# Patient Record
Sex: FEMALE | Race: OTHER | HISPANIC OR LATINO
[De-identification: names, ages, dates, MRNs, and addresses within clinical notes are randomized per-mention and may not be internally consistent; named-entity substitution may affect disease eponyms.]

---

## 2017-01-17 ENCOUNTER — RX RENEWAL (OUTPATIENT)
Age: 82
End: 2017-01-17

## 2017-04-18 ENCOUNTER — RX RENEWAL (OUTPATIENT)
Age: 82
End: 2017-04-18

## 2017-04-25 ENCOUNTER — RX RENEWAL (OUTPATIENT)
Age: 82
End: 2017-04-25

## 2017-05-16 ENCOUNTER — APPOINTMENT (OUTPATIENT)
Dept: INTERNAL MEDICINE | Facility: CLINIC | Age: 82
End: 2017-05-16
Payer: COMMERCIAL

## 2017-05-16 VITALS
HEIGHT: 63 IN | HEART RATE: 71 BPM | OXYGEN SATURATION: 98 % | RESPIRATION RATE: 17 BRPM | WEIGHT: 181 LBS | BODY MASS INDEX: 32.07 KG/M2 | SYSTOLIC BLOOD PRESSURE: 140 MMHG | DIASTOLIC BLOOD PRESSURE: 80 MMHG | TEMPERATURE: 97.9 F

## 2017-05-16 DIAGNOSIS — B34.9 VIRAL INFECTION, UNSPECIFIED: ICD-10-CM

## 2017-05-16 DIAGNOSIS — R05 COUGH: ICD-10-CM

## 2017-05-16 PROCEDURE — 99213 OFFICE O/P EST LOW 20 MIN: CPT

## 2017-05-16 RX ORDER — CHOLECALCIFEROL (VITAMIN D3) 1250 MCG
1.25 MG CAPSULE ORAL
Qty: 12 | Refills: 0 | Status: ACTIVE | COMMUNITY
Start: 2016-12-29

## 2017-05-17 LAB
25(OH)D3 SERPL-MCNC: 45 NG/ML
ALBUMIN SERPL ELPH-MCNC: 4.4 G/DL
ALP BLD-CCNC: 88 U/L
ALT SERPL-CCNC: 11 U/L
ANION GAP SERPL CALC-SCNC: 14 MMOL/L
AST SERPL-CCNC: 14 U/L
BASOPHILS # BLD AUTO: 0.08 K/UL
BASOPHILS NFR BLD AUTO: 1.5 %
BILIRUB SERPL-MCNC: 0.5 MG/DL
BUN SERPL-MCNC: 9 MG/DL
CALCIUM SERPL-MCNC: 9.1 MG/DL
CHLORIDE SERPL-SCNC: 102 MMOL/L
CO2 SERPL-SCNC: 25 MMOL/L
CREAT SERPL-MCNC: 0.65 MG/DL
EOSINOPHIL # BLD AUTO: 0.17 K/UL
EOSINOPHIL NFR BLD AUTO: 3.1 %
GLUCOSE SERPL-MCNC: 103 MG/DL
HBA1C MFR BLD HPLC: 5.3 %
HCT VFR BLD CALC: 42.4 %
HGB BLD-MCNC: 13.8 G/DL
IMM GRANULOCYTES NFR BLD AUTO: 0.2 %
LYMPHOCYTES # BLD AUTO: 1.12 K/UL
LYMPHOCYTES NFR BLD AUTO: 20.3 %
MAN DIFF?: NORMAL
MCHC RBC-ENTMCNC: 30.7 PG
MCHC RBC-ENTMCNC: 32.5 GM/DL
MCV RBC AUTO: 94.4 FL
MONOCYTES # BLD AUTO: 0.47 K/UL
MONOCYTES NFR BLD AUTO: 8.5 %
NEUTROPHILS # BLD AUTO: 3.66 K/UL
NEUTROPHILS NFR BLD AUTO: 66.4 %
PLATELET # BLD AUTO: 202 K/UL
POTASSIUM SERPL-SCNC: 4.6 MMOL/L
PROT SERPL-MCNC: 6.5 G/DL
RBC # BLD: 4.49 M/UL
RBC # FLD: 13.7 %
SODIUM SERPL-SCNC: 141 MMOL/L
TSH SERPL-ACNC: 4.39 UIU/ML
WBC # FLD AUTO: 5.51 K/UL

## 2017-08-29 ENCOUNTER — RX RENEWAL (OUTPATIENT)
Age: 82
End: 2017-08-29

## 2017-11-07 ENCOUNTER — RX RENEWAL (OUTPATIENT)
Age: 82
End: 2017-11-07

## 2017-12-20 ENCOUNTER — APPOINTMENT (OUTPATIENT)
Dept: INTERNAL MEDICINE | Facility: CLINIC | Age: 82
End: 2017-12-20

## 2018-01-09 ENCOUNTER — APPOINTMENT (OUTPATIENT)
Dept: INTERNAL MEDICINE | Facility: CLINIC | Age: 83
End: 2018-01-09
Payer: MEDICARE

## 2018-01-09 ENCOUNTER — LABORATORY RESULT (OUTPATIENT)
Age: 83
End: 2018-01-09

## 2018-01-09 VITALS
BODY MASS INDEX: 30.48 KG/M2 | OXYGEN SATURATION: 98 % | SYSTOLIC BLOOD PRESSURE: 126 MMHG | HEIGHT: 63 IN | HEART RATE: 77 BPM | WEIGHT: 172 LBS | RESPIRATION RATE: 16 BRPM | DIASTOLIC BLOOD PRESSURE: 84 MMHG | TEMPERATURE: 97.2 F

## 2018-01-09 DIAGNOSIS — L53.8 OTHER SPECIFIED ERYTHEMATOUS CONDITIONS: ICD-10-CM

## 2018-01-09 PROCEDURE — 90686 IIV4 VACC NO PRSV 0.5 ML IM: CPT

## 2018-01-09 PROCEDURE — G0008: CPT

## 2018-01-09 PROCEDURE — G0009: CPT

## 2018-01-09 PROCEDURE — 99213 OFFICE O/P EST LOW 20 MIN: CPT | Mod: 25

## 2018-01-09 PROCEDURE — 90670 PCV13 VACCINE IM: CPT

## 2018-01-09 RX ORDER — AZITHROMYCIN 250 MG/1
250 TABLET, FILM COATED ORAL
Qty: 6 | Refills: 0 | Status: DISCONTINUED | COMMUNITY
Start: 2017-05-16 | End: 2018-01-09

## 2018-01-09 RX ORDER — MELOXICAM 15 MG/1
15 TABLET ORAL
Qty: 30 | Refills: 0 | Status: DISCONTINUED | COMMUNITY
Start: 2016-11-18 | End: 2018-01-09

## 2018-01-10 LAB
25(OH)D3 SERPL-MCNC: 53.1 NG/ML
ALBUMIN SERPL ELPH-MCNC: 4.3 G/DL
ALP BLD-CCNC: 82 U/L
ALT SERPL-CCNC: 13 U/L
ANION GAP SERPL CALC-SCNC: 13 MMOL/L
APPEARANCE: CLEAR
AST SERPL-CCNC: 18 U/L
BASOPHILS # BLD AUTO: 0.08 K/UL
BASOPHILS NFR BLD AUTO: 1.8 %
BILIRUB SERPL-MCNC: 0.6 MG/DL
BILIRUBIN URINE: NEGATIVE
BLOOD URINE: NEGATIVE
BUN SERPL-MCNC: 14 MG/DL
CALCIUM SERPL-MCNC: 9.4 MG/DL
CHLORIDE SERPL-SCNC: 99 MMOL/L
CHOLEST SERPL-MCNC: 181 MG/DL
CHOLEST/HDLC SERPL: 3 RATIO
CO2 SERPL-SCNC: 26 MMOL/L
COLOR: YELLOW
CREAT SERPL-MCNC: 0.69 MG/DL
EOSINOPHIL # BLD AUTO: 0.07 K/UL
EOSINOPHIL NFR BLD AUTO: 1.6 %
GLUCOSE QUALITATIVE U: NEGATIVE MG/DL
GLUCOSE SERPL-MCNC: 102 MG/DL
HBA1C MFR BLD HPLC: 5.4 %
HCT VFR BLD CALC: 42.8 %
HDLC SERPL-MCNC: 60 MG/DL
HGB BLD-MCNC: 14 G/DL
IMM GRANULOCYTES NFR BLD AUTO: 0.2 %
KETONES URINE: NEGATIVE
LDLC SERPL CALC-MCNC: 102 MG/DL
LEUKOCYTE ESTERASE URINE: ABNORMAL
LYMPHOCYTES # BLD AUTO: 1.08 K/UL
LYMPHOCYTES NFR BLD AUTO: 24.7 %
MAN DIFF?: NORMAL
MCHC RBC-ENTMCNC: 31.1 PG
MCHC RBC-ENTMCNC: 32.7 GM/DL
MCV RBC AUTO: 95.1 FL
MONOCYTES # BLD AUTO: 0.31 K/UL
MONOCYTES NFR BLD AUTO: 7.1 %
NEUTROPHILS # BLD AUTO: 2.83 K/UL
NEUTROPHILS NFR BLD AUTO: 64.6 %
NITRITE URINE: NEGATIVE
PH URINE: 5
PLATELET # BLD AUTO: 184 K/UL
POTASSIUM SERPL-SCNC: 4.7 MMOL/L
PROT SERPL-MCNC: 6.6 G/DL
PROTEIN URINE: ABNORMAL MG/DL
RBC # BLD: 4.5 M/UL
RBC # FLD: 13.7 %
SODIUM SERPL-SCNC: 138 MMOL/L
SPECIFIC GRAVITY URINE: 1.02
TRIGL SERPL-MCNC: 94 MG/DL
TSH SERPL-ACNC: 4.1 UIU/ML
UROBILINOGEN URINE: NEGATIVE MG/DL
WBC # FLD AUTO: 4.38 K/UL

## 2018-02-26 ENCOUNTER — RX RENEWAL (OUTPATIENT)
Age: 83
End: 2018-02-26

## 2018-03-12 ENCOUNTER — RX RENEWAL (OUTPATIENT)
Age: 83
End: 2018-03-12

## 2018-09-25 NOTE — COUNSELING
[Healthy eating counseling provided] : healthy eating [Activity counseling provided] : activity [___ min/wk activity recommended] : [unfilled] min/wk activity recommended [Walking] : Walking [None] : None [Good understanding] : Patient has a good understanding of lifestyle changes and the steps needed to achieve self management goals

## 2018-09-26 ENCOUNTER — LABORATORY RESULT (OUTPATIENT)
Age: 83
End: 2018-09-26

## 2018-09-26 ENCOUNTER — APPOINTMENT (OUTPATIENT)
Dept: INTERNAL MEDICINE | Facility: CLINIC | Age: 83
End: 2018-09-26
Payer: MEDICARE

## 2018-09-26 VITALS
TEMPERATURE: 98.4 F | HEIGHT: 63 IN | WEIGHT: 163 LBS | DIASTOLIC BLOOD PRESSURE: 83 MMHG | BODY MASS INDEX: 28.88 KG/M2 | RESPIRATION RATE: 16 BRPM | HEART RATE: 71 BPM | SYSTOLIC BLOOD PRESSURE: 169 MMHG | OXYGEN SATURATION: 95 %

## 2018-09-26 DIAGNOSIS — E78.5 HYPERLIPIDEMIA, UNSPECIFIED: ICD-10-CM

## 2018-09-26 DIAGNOSIS — Z00.00 ENCOUNTER FOR GENERAL ADULT MEDICAL EXAMINATION W/OUT ABNORMAL FINDINGS: ICD-10-CM

## 2018-09-26 DIAGNOSIS — F03.90 UNSPECIFIED DEMENTIA W/OUT BEHAVIORAL DISTURBANCE: ICD-10-CM

## 2018-09-26 PROCEDURE — 99214 OFFICE O/P EST MOD 30 MIN: CPT

## 2018-09-26 NOTE — HISTORY OF PRESENT ILLNESS
[Spouse] : spouse [FreeTextEntry1] : f/u [de-identified] : 81 yo  reports worsening of recent memory  in pt.\par PMH mild HTN,PAD,st.p.stent placement to L.popliteal artery,balloon angioplasty to R.popliteal artery,on dual APT.\par h/o HLP,heart murmur,[place] [facility] [place]carotid [place].denies CV Sx's

## 2018-09-26 NOTE — ASSESSMENT
[FreeTextEntry1] : 81 yo with well controlled HTN,HLP,subclinical hypothyroidism,h/o PAD,st.p.b/l revascularisation procedures(stent on the L.,balloon on the R.),denies claudication.PE as before,exc.mild elevation of BP,on repeat take VH=242/82.will increase Atenolol to 50 mg.\par pt and her  refused to try Aricept.\par \par f/u labs requested.

## 2018-09-26 NOTE — PHYSICAL EXAM
[No Acute Distress] : no acute distress [Well Developed] : well developed [Well-Appearing] : well-appearing [Normal Voice/Communication] : normal voice/communication [Normal Sclera/Conjunctiva] : normal sclera/conjunctiva [PERRL] : pupils equal round and reactive to light [EOMI] : extraocular movements intact [Normal Oropharynx] : the oropharynx was normal [No JVD] : no jugular venous distention [Supple] : supple [No Lymphadenopathy] : no lymphadenopathy [Thyroid Normal, No Nodules] : the thyroid was normal and there were no nodules present [No Respiratory Distress] : no respiratory distress  [Clear to Auscultation] : lungs were clear to auscultation bilaterally [Normal Rate] : normal rate  [Regular Rhythm] : with a regular rhythm [Normal S1, S2] : normal S1 and S2 [No Edema] : there was no peripheral edema [Normal Appearance] : normal in appearance [No Nipple Discharge] : no nipple discharge [No Axillary Lymphadenopathy] : no axillary lymphadenopathy [Soft] : abdomen soft [No Masses] : no abdominal mass palpated [No HSM] : no HSM [Normal Sphincter Tone] : normal sphincter tone [No Mass] : no mass [Normal Posterior Cervical Nodes] : no posterior cervical lymphadenopathy [Normal Anterior Cervical Nodes] : no anterior cervical lymphadenopathy [Kyphosis] : kyphosis [No Joint Swelling] : no joint swelling [Normal Gait] : normal gait [No Focal Deficits] : no focal deficits [Normal Affect] : the affect was normal [Normal Insight/Judgement] : insight and judgment were intact [Declined Rectal Exam] : declined rectal exam [No Rash] : no rash [de-identified] : overwt [de-identified] : MICHAEL 1/6 over precordium [de-identified] : faint carotid bruit,b/l.PP not appreciated. [de-identified] : perianal erythema

## 2018-09-27 ENCOUNTER — RX RENEWAL (OUTPATIENT)
Age: 83
End: 2018-09-27

## 2018-09-27 LAB
25(OH)D3 SERPL-MCNC: 29.7 NG/ML
ALBUMIN SERPL ELPH-MCNC: 4.5 G/DL
ALP BLD-CCNC: 91 U/L
ALT SERPL-CCNC: 12 U/L
ANION GAP SERPL CALC-SCNC: 17 MMOL/L
APPEARANCE: CLEAR
AST SERPL-CCNC: 15 U/L
BASOPHILS # BLD AUTO: 0.08 K/UL
BASOPHILS NFR BLD AUTO: 1.7 %
BILIRUB SERPL-MCNC: 0.6 MG/DL
BILIRUBIN URINE: NEGATIVE
BLOOD URINE: NEGATIVE
BUN SERPL-MCNC: 8 MG/DL
CALCIUM SERPL-MCNC: 9.3 MG/DL
CHLORIDE SERPL-SCNC: 100 MMOL/L
CHOLEST SERPL-MCNC: 161 MG/DL
CHOLEST/HDLC SERPL: 2.6 RATIO
CO2 SERPL-SCNC: 25 MMOL/L
COLOR: YELLOW
CREAT SERPL-MCNC: 0.61 MG/DL
EOSINOPHIL # BLD AUTO: 0.09 K/UL
EOSINOPHIL NFR BLD AUTO: 1.9 %
GLUCOSE QUALITATIVE U: NEGATIVE MG/DL
GLUCOSE SERPL-MCNC: 79 MG/DL
HBA1C MFR BLD HPLC: 5.5 %
HCT VFR BLD CALC: 42.2 %
HDLC SERPL-MCNC: 61 MG/DL
HGB BLD-MCNC: 14.2 G/DL
IMM GRANULOCYTES NFR BLD AUTO: 0 %
KETONES URINE: NEGATIVE
LDLC SERPL CALC-MCNC: 79 MG/DL
LEUKOCYTE ESTERASE URINE: ABNORMAL
LYMPHOCYTES # BLD AUTO: 1.17 K/UL
LYMPHOCYTES NFR BLD AUTO: 24.6 %
MAN DIFF?: NORMAL
MCHC RBC-ENTMCNC: 31.6 PG
MCHC RBC-ENTMCNC: 33.6 GM/DL
MCV RBC AUTO: 94 FL
MONOCYTES # BLD AUTO: 0.32 K/UL
MONOCYTES NFR BLD AUTO: 6.7 %
NEUTROPHILS # BLD AUTO: 3.09 K/UL
NEUTROPHILS NFR BLD AUTO: 65.1 %
NITRITE URINE: NEGATIVE
PH URINE: 6.5
PLATELET # BLD AUTO: 175 K/UL
POTASSIUM SERPL-SCNC: 4.4 MMOL/L
PROT SERPL-MCNC: 6.8 G/DL
PROTEIN URINE: NEGATIVE MG/DL
RBC # BLD: 4.49 M/UL
RBC # FLD: 13.9 %
SODIUM SERPL-SCNC: 142 MMOL/L
SPECIFIC GRAVITY URINE: 1.01
TRIGL SERPL-MCNC: 103 MG/DL
TSH SERPL-ACNC: 3.14 UIU/ML
UROBILINOGEN URINE: 1 MG/DL
WBC # FLD AUTO: 4.75 K/UL

## 2018-10-01 LAB
T PALLIDUM AB SER QL IA: NEGATIVE
VIT B12 SERPL-MCNC: 430 PG/ML

## 2018-10-26 ENCOUNTER — RX RENEWAL (OUTPATIENT)
Age: 83
End: 2018-10-26

## 2018-11-13 ENCOUNTER — APPOINTMENT (OUTPATIENT)
Dept: INTERNAL MEDICINE | Facility: CLINIC | Age: 83
End: 2018-11-13
Payer: MEDICARE

## 2018-11-13 PROCEDURE — G0008: CPT

## 2018-11-13 PROCEDURE — 90662 IIV NO PRSV INCREASED AG IM: CPT

## 2018-12-13 ENCOUNTER — MEDICATION RENEWAL (OUTPATIENT)
Age: 83
End: 2018-12-13

## 2019-02-19 RX ORDER — DESONIDE 0.5 MG/G
0.05 CREAM TOPICAL
Qty: 1 | Refills: 1 | Status: ACTIVE | COMMUNITY
Start: 2018-01-09 | End: 1900-01-01

## 2019-05-22 ENCOUNTER — INPATIENT (INPATIENT)
Facility: HOSPITAL | Age: 84
LOS: 1 days | Discharge: HOME | DRG: 554 | End: 2019-05-24
Attending: INTERNAL MEDICINE | Admitting: INTERNAL MEDICINE
Payer: MEDICARE

## 2019-05-22 ENCOUNTER — APPOINTMENT (OUTPATIENT)
Dept: INTERNAL MEDICINE | Facility: CLINIC | Age: 84
End: 2019-05-22
Payer: MEDICARE

## 2019-05-22 VITALS
WEIGHT: 169.98 LBS | OXYGEN SATURATION: 96 % | RESPIRATION RATE: 16 BRPM | TEMPERATURE: 98 F | HEIGHT: 65 IN | DIASTOLIC BLOOD PRESSURE: 84 MMHG | SYSTOLIC BLOOD PRESSURE: 142 MMHG | HEART RATE: 81 BPM

## 2019-05-22 VITALS
SYSTOLIC BLOOD PRESSURE: 152 MMHG | HEART RATE: 86 BPM | RESPIRATION RATE: 16 BRPM | DIASTOLIC BLOOD PRESSURE: 80 MMHG | OXYGEN SATURATION: 98 %

## 2019-05-22 DIAGNOSIS — I99.9 UNSPECIFIED DISORDER OF CIRCULATORY SYSTEM: Chronic | ICD-10-CM

## 2019-05-22 DIAGNOSIS — Z98.62 PERIPHERAL VASCULAR ANGIOPLASTY STATUS: Chronic | ICD-10-CM

## 2019-05-22 DIAGNOSIS — I73.9 PERIPHERAL VASCULAR DISEASE, UNSPECIFIED: ICD-10-CM

## 2019-05-22 DIAGNOSIS — L03.90 CELLULITIS, UNSPECIFIED: ICD-10-CM

## 2019-05-22 LAB
ACETONE SERPL-MCNC: NEGATIVE — SIGNIFICANT CHANGE UP
ALBUMIN SERPL ELPH-MCNC: 3.7 G/DL — SIGNIFICANT CHANGE UP (ref 3.5–5)
ALP SERPL-CCNC: 92 U/L — SIGNIFICANT CHANGE UP (ref 40–120)
ALT FLD-CCNC: 15 U/L DA — SIGNIFICANT CHANGE UP (ref 10–60)
ANION GAP SERPL CALC-SCNC: 7 MMOL/L — SIGNIFICANT CHANGE UP (ref 5–17)
APPEARANCE UR: ABNORMAL
APTT BLD: 29.8 SEC — SIGNIFICANT CHANGE UP (ref 27.5–36.3)
AST SERPL-CCNC: 22 U/L — SIGNIFICANT CHANGE UP (ref 10–40)
BACTERIA # UR AUTO: ABNORMAL /HPF
BASOPHILS # BLD AUTO: 0.09 K/UL — SIGNIFICANT CHANGE UP (ref 0–0.2)
BASOPHILS NFR BLD AUTO: 1.4 % — SIGNIFICANT CHANGE UP (ref 0–2)
BILIRUB SERPL-MCNC: 0.7 MG/DL — SIGNIFICANT CHANGE UP (ref 0.2–1.2)
BILIRUB UR-MCNC: NEGATIVE — SIGNIFICANT CHANGE UP
BUN SERPL-MCNC: 7 MG/DL — SIGNIFICANT CHANGE UP (ref 7–18)
CALCIUM SERPL-MCNC: 8.6 MG/DL — SIGNIFICANT CHANGE UP (ref 8.4–10.5)
CHLORIDE SERPL-SCNC: 103 MMOL/L — SIGNIFICANT CHANGE UP (ref 96–108)
CO2 SERPL-SCNC: 28 MMOL/L — SIGNIFICANT CHANGE UP (ref 22–31)
COLOR SPEC: YELLOW — SIGNIFICANT CHANGE UP
CREAT SERPL-MCNC: 0.66 MG/DL — SIGNIFICANT CHANGE UP (ref 0.5–1.3)
DIFF PNL FLD: ABNORMAL
EOSINOPHIL # BLD AUTO: 0.03 K/UL — SIGNIFICANT CHANGE UP (ref 0–0.5)
EOSINOPHIL NFR BLD AUTO: 0.5 % — SIGNIFICANT CHANGE UP (ref 0–6)
EPI CELLS # UR: ABNORMAL /HPF
GLUCOSE SERPL-MCNC: 129 MG/DL — HIGH (ref 70–99)
GLUCOSE UR QL: NEGATIVE — SIGNIFICANT CHANGE UP
HCT VFR BLD CALC: 39.9 % — SIGNIFICANT CHANGE UP (ref 34.5–45)
HGB BLD-MCNC: 13.3 G/DL — SIGNIFICANT CHANGE UP (ref 11.5–15.5)
IMM GRANULOCYTES NFR BLD AUTO: 0.3 % — SIGNIFICANT CHANGE UP (ref 0–1.5)
INR BLD: 1.09 RATIO — SIGNIFICANT CHANGE UP (ref 0.88–1.16)
KETONES UR-MCNC: ABNORMAL
LACTATE SERPL-SCNC: 1.1 MMOL/L — SIGNIFICANT CHANGE UP (ref 0.7–2)
LEUKOCYTE ESTERASE UR-ACNC: ABNORMAL
LYMPHOCYTES # BLD AUTO: 1.04 K/UL — SIGNIFICANT CHANGE UP (ref 1–3.3)
LYMPHOCYTES # BLD AUTO: 15.9 % — SIGNIFICANT CHANGE UP (ref 13–44)
MAGNESIUM SERPL-MCNC: 2 MG/DL — SIGNIFICANT CHANGE UP (ref 1.6–2.6)
MCHC RBC-ENTMCNC: 31.3 PG — SIGNIFICANT CHANGE UP (ref 27–34)
MCHC RBC-ENTMCNC: 33.3 GM/DL — SIGNIFICANT CHANGE UP (ref 32–36)
MCV RBC AUTO: 93.9 FL — SIGNIFICANT CHANGE UP (ref 80–100)
MONOCYTES # BLD AUTO: 0.45 K/UL — SIGNIFICANT CHANGE UP (ref 0–0.9)
MONOCYTES NFR BLD AUTO: 6.9 % — SIGNIFICANT CHANGE UP (ref 2–14)
NEUTROPHILS # BLD AUTO: 4.92 K/UL — SIGNIFICANT CHANGE UP (ref 1.8–7.4)
NEUTROPHILS NFR BLD AUTO: 75 % — SIGNIFICANT CHANGE UP (ref 43–77)
NITRITE UR-MCNC: POSITIVE
NRBC # BLD: 0 /100 WBCS — SIGNIFICANT CHANGE UP (ref 0–0)
NT-PROBNP SERPL-SCNC: 1002 PG/ML — HIGH (ref 0–450)
PH UR: 5 — SIGNIFICANT CHANGE UP (ref 5–8)
PLATELET # BLD AUTO: 202 K/UL — SIGNIFICANT CHANGE UP (ref 150–400)
POTASSIUM SERPL-MCNC: 3.6 MMOL/L — SIGNIFICANT CHANGE UP (ref 3.5–5.3)
POTASSIUM SERPL-SCNC: 3.6 MMOL/L — SIGNIFICANT CHANGE UP (ref 3.5–5.3)
PROT SERPL-MCNC: 6.9 G/DL — SIGNIFICANT CHANGE UP (ref 6–8.3)
PROT UR-MCNC: 100
PROTHROM AB SERPL-ACNC: 12.1 SEC — SIGNIFICANT CHANGE UP (ref 10–12.9)
RBC # BLD: 4.25 M/UL — SIGNIFICANT CHANGE UP (ref 3.8–5.2)
RBC # FLD: 13.2 % — SIGNIFICANT CHANGE UP (ref 10.3–14.5)
RBC CASTS # UR COMP ASSIST: ABNORMAL /HPF (ref 0–2)
SODIUM SERPL-SCNC: 138 MMOL/L — SIGNIFICANT CHANGE UP (ref 135–145)
SP GR SPEC: 1.02 — SIGNIFICANT CHANGE UP (ref 1.01–1.02)
UROBILINOGEN FLD QL: 1
WBC # BLD: 6.55 K/UL — SIGNIFICANT CHANGE UP (ref 3.8–10.5)
WBC # FLD AUTO: 6.55 K/UL — SIGNIFICANT CHANGE UP (ref 3.8–10.5)
WBC UR QL: >50 /HPF (ref 0–5)

## 2019-05-22 PROCEDURE — 71045 X-RAY EXAM CHEST 1 VIEW: CPT | Mod: 26

## 2019-05-22 PROCEDURE — 99285 EMERGENCY DEPT VISIT HI MDM: CPT | Mod: 25

## 2019-05-22 PROCEDURE — 73620 X-RAY EXAM OF FOOT: CPT | Mod: 26,RT

## 2019-05-22 PROCEDURE — 99214 OFFICE O/P EST MOD 30 MIN: CPT

## 2019-05-22 PROCEDURE — 93971 EXTREMITY STUDY: CPT | Mod: 26,RT

## 2019-05-22 RX ORDER — VANCOMYCIN HCL 1 G
1000 VIAL (EA) INTRAVENOUS ONCE
Refills: 0 | Status: COMPLETED | OUTPATIENT
Start: 2019-05-22 | End: 2019-05-22

## 2019-05-22 RX ORDER — DIAZEPAM 5 MG
1 TABLET ORAL
Qty: 4 | Refills: 0
Start: 2019-05-22 | End: 2019-05-23

## 2019-05-22 RX ORDER — ACETAMINOPHEN 500 MG
1000 TABLET ORAL ONCE
Refills: 0 | Status: COMPLETED | OUTPATIENT
Start: 2019-05-22 | End: 2019-05-22

## 2019-05-22 RX ORDER — SODIUM CHLORIDE 9 MG/ML
1000 INJECTION INTRAMUSCULAR; INTRAVENOUS; SUBCUTANEOUS
Refills: 0 | Status: DISCONTINUED | OUTPATIENT
Start: 2019-05-22 | End: 2019-05-23

## 2019-05-22 RX ADMIN — Medication 400 MILLIGRAM(S): at 21:26

## 2019-05-22 RX ADMIN — Medication 1000 MILLIGRAM(S): at 22:15

## 2019-05-22 RX ADMIN — Medication 250 MILLIGRAM(S): at 22:54

## 2019-05-22 RX ADMIN — SODIUM CHLORIDE 125 MILLILITER(S): 9 INJECTION INTRAMUSCULAR; INTRAVENOUS; SUBCUTANEOUS at 21:27

## 2019-05-22 NOTE — ED PROVIDER NOTE - CARE PLAN
Principal Discharge DX:	Cellulitis  Secondary Diagnosis:	UTI (urinary tract infection)  Secondary Diagnosis:	Leg edema, right  Secondary Diagnosis:	Foot pain

## 2019-05-22 NOTE — ED ADULT NURSE NOTE - PMH
Dementia without behavioral disturbance, unspecified dementia type    Hyperlipidemia, unspecified hyperlipidemia type    Hypertension, unspecified type    Hypothyroidism, unspecified type    Peripheral vascular disease    Skin cancer

## 2019-05-22 NOTE — ED PROVIDER NOTE - OBJECTIVE STATEMENT
82 y/o F patient with a significant PMHx of Dementia, HLD, HTN, PVD and significant PSHx of complication of peripheral vessels, angioplasty of peripheral vessel presents to the ED with her son c/o R leg pain x month. Patient's son states that the patient went to her PMD for R leg pain that has occurring for a month. Patient's son reports that yesterday, patient's foot got swollen and is currently in pain. Patient was unable to recall what occurred. Patient was sent from her PMD to r/o infection. Patient denies any fever, vomiting, chills, or any other complains. Allergies: penicillin 82 y/o F patient with a significant PMHx of Dementia, HLD, HTN, PVD and significant PSHx of complication of peripheral vessels, angioplasty of peripheral vessel presents to the ED with her son c/o R leg pain x month. Patient's son states that the patient went to her PMD for R leg pain that has occurring for a month. Patient's son reports that yesterday, patient's foot got swollen and is currently in pain. Patient was unable to recall what occurred. Patient was sent from her PMD to r/o infection. Patient denies any fever, vomiting, chills, or any other complains. Allergies: penicillin  pt was seen @ Urgent Care last week- ankle x-ray done- told no fx

## 2019-05-22 NOTE — ED ADULT NURSE NOTE - OBJECTIVE STATEMENT
Pt came in for right foot pain, swelling, redness that has gotten worse in the past week. Pt is forgetful.

## 2019-05-22 NOTE — ED ADULT NURSE NOTE - CHIEF COMPLAINT QUOTE
brought in for r foot swelling an redness to the great toe, feeling more weak now   sent in from MD office to  r/o cellulitis or osteomyelitis

## 2019-05-22 NOTE — ED ADULT TRIAGE NOTE - CHIEF COMPLAINT QUOTE
brought in for r foot swelling an redness to the great toe, feeling more weak now   sent in fromMD office to  r/o cellulitis or osteomyelitis

## 2019-05-22 NOTE — ED PROVIDER NOTE - MUSCULOSKELETAL, MLM
Spine appears normal, range of motion is not limited, RLE- 1/2 below leg/foot- edematous, warm to touch, dorsal aspect-tenderness to palp., Rt big toe/1st MTP-erythematous, very tenderness to palp., FROM

## 2019-05-23 DIAGNOSIS — N39.0 URINARY TRACT INFECTION, SITE NOT SPECIFIED: ICD-10-CM

## 2019-05-23 DIAGNOSIS — E03.9 HYPOTHYROIDISM, UNSPECIFIED: ICD-10-CM

## 2019-05-23 DIAGNOSIS — I10 ESSENTIAL (PRIMARY) HYPERTENSION: ICD-10-CM

## 2019-05-23 DIAGNOSIS — E78.5 HYPERLIPIDEMIA, UNSPECIFIED: ICD-10-CM

## 2019-05-23 DIAGNOSIS — Z29.9 ENCOUNTER FOR PROPHYLACTIC MEASURES, UNSPECIFIED: ICD-10-CM

## 2019-05-23 DIAGNOSIS — M79.673 PAIN IN UNSPECIFIED FOOT: ICD-10-CM

## 2019-05-23 LAB
APPEARANCE UR: CLEAR — SIGNIFICANT CHANGE UP
BILIRUB UR-MCNC: NEGATIVE — SIGNIFICANT CHANGE UP
COLOR SPEC: YELLOW — SIGNIFICANT CHANGE UP
DIFF PNL FLD: ABNORMAL
EPI CELLS # UR: SIGNIFICANT CHANGE UP /HPF
GLUCOSE UR QL: NEGATIVE — SIGNIFICANT CHANGE UP
KETONES UR-MCNC: ABNORMAL
LEUKOCYTE ESTERASE UR-ACNC: ABNORMAL
NITRITE UR-MCNC: NEGATIVE — SIGNIFICANT CHANGE UP
PH UR: 6 — SIGNIFICANT CHANGE UP (ref 5–8)
PROT UR-MCNC: 15
RBC CASTS # UR COMP ASSIST: SIGNIFICANT CHANGE UP /HPF (ref 0–2)
SP GR SPEC: 1.01 — SIGNIFICANT CHANGE UP (ref 1.01–1.02)
UROBILINOGEN FLD QL: NEGATIVE — SIGNIFICANT CHANGE UP
WBC UR QL: ABNORMAL /HPF (ref 0–5)

## 2019-05-23 PROCEDURE — 99223 1ST HOSP IP/OBS HIGH 75: CPT

## 2019-05-23 PROCEDURE — 93010 ELECTROCARDIOGRAM REPORT: CPT

## 2019-05-23 RX ORDER — ACETAMINOPHEN 500 MG
650 TABLET ORAL EVERY 6 HOURS
Refills: 0 | Status: DISCONTINUED | OUTPATIENT
Start: 2019-05-23 | End: 2019-05-24

## 2019-05-23 RX ORDER — IBUPROFEN 200 MG
400 TABLET ORAL ONCE
Refills: 0 | Status: COMPLETED | OUTPATIENT
Start: 2019-05-23 | End: 2019-05-23

## 2019-05-23 RX ORDER — LEVOTHYROXINE SODIUM 125 MCG
25 TABLET ORAL DAILY
Refills: 0 | Status: DISCONTINUED | OUTPATIENT
Start: 2019-05-23 | End: 2019-05-24

## 2019-05-23 RX ORDER — ENOXAPARIN SODIUM 100 MG/ML
40 INJECTION SUBCUTANEOUS DAILY
Refills: 0 | Status: DISCONTINUED | OUTPATIENT
Start: 2019-05-23 | End: 2019-05-24

## 2019-05-23 RX ORDER — HALOPERIDOL DECANOATE 100 MG/ML
1 INJECTION INTRAMUSCULAR ONCE
Refills: 0 | Status: COMPLETED | OUTPATIENT
Start: 2019-05-23 | End: 2019-05-23

## 2019-05-23 RX ADMIN — Medication 250 MILLIGRAM(S): at 13:32

## 2019-05-23 RX ADMIN — Medication 650 MILLIGRAM(S): at 09:32

## 2019-05-23 RX ADMIN — Medication 650 MILLIGRAM(S): at 10:30

## 2019-05-23 RX ADMIN — Medication 400 MILLIGRAM(S): at 18:12

## 2019-05-23 RX ADMIN — Medication 40 MILLIGRAM(S): at 17:12

## 2019-05-23 RX ADMIN — HALOPERIDOL DECANOATE 1 MILLIGRAM(S): 100 INJECTION INTRAMUSCULAR at 04:07

## 2019-05-23 RX ADMIN — ENOXAPARIN SODIUM 40 MILLIGRAM(S): 100 INJECTION SUBCUTANEOUS at 12:38

## 2019-05-23 RX ADMIN — Medication 1 TABLET(S): at 17:11

## 2019-05-23 RX ADMIN — Medication 250 MILLIGRAM(S): at 14:30

## 2019-05-23 RX ADMIN — Medication 250 MILLIGRAM(S): at 22:47

## 2019-05-23 RX ADMIN — Medication 250 MILLIGRAM(S): at 22:00

## 2019-05-23 RX ADMIN — Medication 400 MILLIGRAM(S): at 17:12

## 2019-05-23 NOTE — H&P ADULT - ATTENDING COMMENTS
Patient seen and examined ; case was discussed with the admitting resident    ROS: as in the HPI; all other ROS negative    SH and family history as above    Vital Signs Last 24 Hrs  T(C): 36.6 (23 May 2019 05:11), Max: 36.7 (22 May 2019 23:44)  T(F): 97.9 (23 May 2019 05:11), Max: 98 (22 May 2019 23:44)  HR: 92 (23 May 2019 05:11) (81 - 92)  BP: 147/64 (23 May 2019 05:11) (142/84 - 155/73)  BP(mean): --  RR: 18 (23 May 2019 05:11) (16 - 18)  SpO2: 99% (23 May 2019 05:11) (96% - 99%)    GEN: NAD  HEENT- normocephalic; mouth dry   CVS- S1S2+  LUNGS- clear to auscultation; no wheezing  ABD: Soft , nontender, nondistended, Bowel sounds are present  EXTREMITY: RLE- pulses intact,varicosities, venous stasis and hematoma, severe tenderness to light touch and pain over medial and lateral malleolus, edema over dorsum, 1st MTP erythematous and warm to touch.   NEURO: AAOx3; non focal neurologic exam; cranial nerves grossly intact  PSYCH: normal affect and behavior  BACK: no swelling or mass;   VASCULAR: ++ distal peripheral pulses  SKIN: warm and dry.       Labs Reviewed:                         13.3   6.55  )-----------( 202      ( 22 May 2019 19:42 )             39.9         138  |  103  |  7   ----------------------------<  129<H>  3.6   |  28  |  0.66    Ca    8.6      22 May 2019 19:42  Mg     2.0         TPro  6.9  /  Alb  3.7  /  TBili  0.7  /  DBili  x   /  AST  22  /  ALT  15  /  AlkPhos  92          Urinalysis Basic - ( 22 May 2019 19:42 )    Color: Yellow / Appearance: Slightly Turbid / S.025 / pH: x  Gluc: x / Ketone: Moderate  / Bili: Negative / Urobili: 1   Blood: x / Protein: 100 / Nitrite: Positive   Leuk Esterase: Moderate / RBC: 10-25 /HPF / WBC >50 /HPF   Sq Epi: x / Non Sq Epi: Moderate /HPF / Bacteria: Many /HPF      PT/INR - ( 22 May 2019 19:42 )   PT: 12.1 sec;   INR: 1.09 ratio         PTT - ( 22 May 2019 19:42 )  PTT:29.8 sec  BNP: Serum Pro-Brain Natriuretic Peptide: 1002 pg/mL ( @ 19:42)    MEDICATIONS  (STANDING):  enoxaparin Injectable 40 milliGRAM(s) SubCutaneous daily  levothyroxine 25 MICROGram(s) Oral daily    MEDICATIONS  (PRN):  acetaminophen   Tablet .. 650 milliGRAM(s) Oral every 6 hours PRN Mild Pain (1 - 3)    Venous Duplex reviewed  Xray foot reviewed     84 y/o F of dementia, HTN, PVD admitted with inability to ambulate 2/2 severe RLE foot and ankle pain.     1. Inability to ambulate 2/2 severe RLE foot and ankle pain- severe intractable pain in setting of edema and redness and warmth, ddx includes acute gout ?unclear medications or hx, cellulitis, occult fx. F/u final read of Xray, request podiatry consult and MRI to assess further. Check esr/crp/uric acid, follow up blood cultures, treat with naproxen, add Bactrim given hx of pcn allergey and concomitant cellulitis. Monitor SCr.   2. UTI- unclear history, will cover empirically with rx for cellulitis above, f/u cultures   3. Dementia- at baseline per ED attending who spoke with family, night team attempts have been unsuccessful to verify hx and   4. HTN- consider switching atenolol to losartan if gout dx   5. Debility- PT consult.      Plan of care discussed with patient ;  all questions and concerns were addressed.

## 2019-05-23 NOTE — PROGRESS NOTE ADULT - ASSESSMENT
Patient is a poor historian secondary to dementia who is unsure about why she is here, saying "I may have felt dizzy or something, I don't remember". When asked about her pain in foot, she says she has pain in right first toe since 3 days, but later says it started a week ago. She denies any preceding trauma or similar event in the past; also denies fever, chills, cough, diarrhea or any other symptoms.  As per ED note, patient was brought in by her son who reports pain started a month ago and was seeing her PMD for the same but her foot got swollen a day ago and was sent her by her PMD to r/o infection.     Pt. was admitted to medicine for Gout and UTI.  Pt. seen and examined, c/o right foot pain but otherwise ok.  Pt. is alert and oriented, HD stable in NAD.

## 2019-05-23 NOTE — PROGRESS NOTE ADULT - SUBJECTIVE AND OBJECTIVE BOX
NP Note discussed with  Primary Attending    Patient is a 83y old  Female who presents with a chief complaint of right foot pain (23 May 2019 00:37)      INTERVAL HPI/OVERNIGHT EVENTS: no new complaints    MEDICATIONS  (STANDING):  enoxaparin Injectable 40 milliGRAM(s) SubCutaneous daily  ibuprofen  Tablet. 400 milliGRAM(s) Oral once  levothyroxine 25 MICROGram(s) Oral daily  naproxen 250 milliGRAM(s) Oral every 8 hours  predniSONE   Tablet 40 milliGRAM(s) Oral daily  trimethoprim  160 mG/sulfamethoxazole 800 mG 1 Tablet(s) Oral every 12 hours    MEDICATIONS  (PRN):  acetaminophen   Tablet .. 650 milliGRAM(s) Oral every 6 hours PRN Mild Pain (1 - 3)      __________________________________________________  REVIEW OF SYSTEMS:    CONSTITUTIONAL: No fever,   EYES: no acute visual disturbances  NECK: No pain or stiffness  RESPIRATORY: No cough; No shortness of breath  CARDIOVASCULAR: No chest pain, no palpitations  GASTROINTESTINAL: No pain. No nausea or vomiting; No diarrhea   NEUROLOGICAL: No headache or numbness, no tremors  MUSCULOSKELETAL: No joint pain, no muscle pain  GENITOURINARY: no dysuria, no frequency, no hesitancy  PSYCHIATRY: no depression , no anxiety  ALL OTHER  ROS negative        Vital Signs Last 24 Hrs  T(C): 36.8 (23 May 2019 14:11), Max: 36.8 (23 May 2019 14:11)  T(F): 98.3 (23 May 2019 14:11), Max: 98.3 (23 May 2019 14:11)  HR: 95 (23 May 2019 14:11) (81 - 95)  BP: 144/73 (23 May 2019 14:11) (142/84 - 155/73)  BP(mean): --  RR: 18 (23 May 2019 14:11) (16 - 18)  SpO2: 99% (23 May 2019 14:11) (96% - 99%)    ________________________________________________  PHYSICAL EXAM:  GENERAL: NAD  HEENT: Normocephalic;  conjunctivae and sclerae clear; moist mucous membranes;   NECK : supple  CHEST/LUNG: Clear to auscultation bilaterally with good air entry   HEART: S1 S2  regular; no murmurs, gallops or rubs  ABDOMEN: Soft, Nontender, Nondistended; Bowel sounds present  EXTREMITIES: no cyanosis; no edema; no calf tenderness  SKIN: warm and dry; no rash  NERVOUS SYSTEM:  Awake and alert; Oriented  to place, person and time ; no new deficits    _________________________________________________  LABS:                        13.3   6.55  )-----------( 202      ( 22 May 2019 19:42 )             39.9         138  |  103  |  7   ----------------------------<  129<H>  3.6   |  28  |  0.66    Ca    8.6      22 May 2019 19:42  Mg     2.0         TPro  6.9  /  Alb  3.7  /  TBili  0.7  /  DBili  x   /  AST    /  ALT  15  /  AlkPhos  92      PT/INR - ( 22 May 2019 19:42 )   PT: 12.1 sec;   INR: 1.09 ratio         PTT - ( 22 May 2019 19:42 )  PTT:29.8 sec  Urinalysis Basic - ( 22 May 2019 19:42 )    Color: Yellow / Appearance: Slightly Turbid / S.025 / pH: x  Gluc: x / Ketone: Moderate  / Bili: Negative / Urobili: 1   Blood: x / Protein: 100 / Nitrite: Positive   Leuk Esterase: Moderate / RBC: 10-25 /HPF / WBC >50 /HPF   Sq Epi: x / Non Sq Epi: Moderate /HPF / Bacteria: Many /HPF      CAPILLARY BLOOD GLUCOSE            RADIOLOGY & ADDITIONAL TESTS:    Imaging Personally Reviewed:  YES/NO    Consultant(s) Notes Reviewed:   YES/ No    Care Discussed with Consultants :     Plan of care was discussed with patient and /or primary care giver; all questions and concerns were addressed and care was aligned with patient's wishes.

## 2019-05-23 NOTE — H&P ADULT - NSICDXPASTSURGICALHX_GEN_ALL_CORE_FT
PAST SURGICAL HISTORY:  Complication of peripheral vessels     History of angioplasty of peripheral vessel

## 2019-05-23 NOTE — H&P ADULT - PROBLEM SELECTOR PLAN 3
Patient ramirez snot remember home meds  On atenolol 50mg as per EMR  Monitor BP, resume meds once confirmed

## 2019-05-23 NOTE — H&P ADULT - NEGATIVE NEUROLOGICAL SYMPTOMS
no loss of consciousness/no hemiparesis/no headache/no weakness/no paresthesias/no tremors/no syncope/no difficulty walking/no vertigo

## 2019-05-23 NOTE — PROGRESS NOTE ADULT - PROBLEM SELECTOR PLAN 1
Patient presents with pain and swelling of right foot.  As per son, foot swelling and big toe erythema started 2 weeks ago gradually worsening  -Start Prednisone taper for tx Gout  -pain control with Tylenol/Ibuprofen  -Podiatry consulted for long toe nails  -f/u blood cultures, esr/crp/uric acid levels

## 2019-05-23 NOTE — PATIENT PROFILE ADULT - NSASFALLATTEMPTOOB_GEN_A_NUR
Dr Mays,     Can you address her specific question about how long to take the medication?  I have sent her some general information as you can see below.    .Chary Reyes RN, BSN   [facility]      I had a long conversation with her about Shingles and she will raise the dose of Acyclovir to 5 times daily to complete 7 days. Unlikely to recur any time soon   yes

## 2019-05-23 NOTE — H&P ADULT - PROBLEM SELECTOR PLAN 1
Patient presents with pain and swelling of right foot. Denies trauma, but poor historian due to dementia  Physical exam with erythema and edema of right foot with tenderness particularly in first ray.   Differentials include cellulitis or gout or ligament injury  -iv antibiotics: would start on   -pain control, bed rest for now  -no obvious fracture or dislocation noted on xray of right foot, f/u final read  -f/u blood cultures, esr/crp/uric acid levels Patient presents with pain and swelling of right foot. Denies trauma, but poor historian due to dementia  Physical exam with erythema and edema of right foot with tenderness particularly in first ray.   Differentials include cellulitis or gout or ligament injury  -iv antibiotics: would start on   -pain control, bed rest for now  -start bactrim in setting of penicillin allergy  -no obvious fracture or dislocation noted on xray of right foot, f/u final read  -f/u blood cultures, esr/crp/uric acid levels  -monitor BMP  Podiatry consulted Patient presents with pain and swelling of right foot. Denies trauma, but poor historian due to dementia  Physical exam with erythema and edema of right foot with tenderness particularly in first ray.   Differentials include cellulitis or gout or ligament injury  -iv antibiotics: would start on   -pain control, bed rest for now  -start bactrim in setting of penicillin allergy  -no obvious fracture or dislocation noted on xray of right foot, f/u final read  -f/u blood cultures, esr/crp/uric acid levels  -tiral of naproxen  -monitor BMP  Would consider mri of foot, but son cannot be reached to evaluate for any metals in body  Podiatry consulted

## 2019-05-23 NOTE — H&P ADULT - NSICDXFAMILYHX_GEN_ALL_CORE_FT
FAMILY HISTORY:  Family history of hypertension in mother  FH: CAD (coronary artery disease), grandmother

## 2019-05-23 NOTE — H&P ADULT - ASSESSMENT
83F with PMH of dementia, HTN, HLD presented to ER with pain in right first toe.     In ER, her vitals were stable, labs wnl. UA was noted to be positive. She was given a dose each of levaquin and vancomycin. 83F with PMH of dementia, HTN, HLD presented to ER with pain in right first toe.     In ER, her vitals were stable, labs wnl. UA was noted to be positive. Doppler negative for DVT. She was given a dose each of levaquin and vancomycin. 83F with PMH of dementia, HTN, HLD presented to ER with pain in right first toe.     In ER, her vitals were stable, labs wnl. UA was noted to be positive. Doppler negative for DVT. She was given a dose each of levaquin and vancomycin.     **patient's home meds not confirmed, could not reach son

## 2019-05-23 NOTE — PROGRESS NOTE ADULT - PROBLEM SELECTOR PLAN 6
IMPROVE VTE Individual Risk Assessment  RISK                                                                Points  [  ] Previous VTE                                                  3  [  ] Thrombophilia                                               2  [  ] Lower limb paralysis                                      2        (unable to hold up >15 seconds)    [  ] Current Cancer                                              2         (within 6 months)  [ x ] Immobilization > 24 hrs                                1  [  ] ICU/CCU stay > 24 hours                              1  [ x ] Age > 60                                                      1  IMPROVE VTE Score 2; lovenox for dvt ppx

## 2019-05-23 NOTE — H&P ADULT - SKIN COMMENTS
erythema of skin of 1st right toe; right foot swollen and tender upto the ankle joint, with bruises noted on medical aspect of the joint

## 2019-05-23 NOTE — H&P ADULT - NSHPSOCIALHISTORY_GEN_ALL_CORE
Patient lives with  and sons, ambulates independently, denies smoking, alcohol or illicit drug use; diet wise mostly prefers vegetarian food.

## 2019-05-23 NOTE — H&P ADULT - PROBLEM SELECTOR PLAN 4
DISPLAY PLAN FREE TEXT DISPLAY PLAN FREE TEXT DISPLAY PLAN FREE TEXT DISPLAY PLAN FREE TEXT DISPLAY PLAN FREE TEXT Patient on simvastatin on home as per  EMR  -f/u lipid profile, A1C

## 2019-05-23 NOTE — H&P ADULT - PROBLEM SELECTOR PLAN 2
Patient noted to have positive U/A  however asymptomatic  likely asymptomatic bacteruria Patient noted to have positive U/A  was asymptomatic on admission however developed confusion overnight with severe agitation requiring small dose of haldol  will start bactrim for now  -f/u urine cultures

## 2019-05-23 NOTE — H&P ADULT - HISTORY OF PRESENT ILLNESS
83F with PMH of dementia, HTN, HLD presented to ER with pain in right first toe.     Patient is a poor historian secondary to dementia 83F with PMH of dementia, HTN, HLD presented to ER with pain in right first toe.     Patient is a poor historian secondary to dementia who is unsure about why she is here, saying "I may have felt dizzy or something, I don't remember". When asked about her pain in foot, she says she has pain in right first toe since 3 days, but later says it started a week ago. She denies any preceding trauma or similar event in the past; also denies fever, chills, cough, diarrhea or any other symptoms.  As per ED note, patient was brought in by her son who reports pain started a month ago and was seeing her PMD for the same but her foot got swollen a day ago and was sent her by her PMD to r/o infection. 83F with PMH of dementia, HTN, HLD presented to ER with pain in right first toe.     Patient is a poor historian secondary to dementia who is unsure about why she is here, saying "I may have felt dizzy or something, I don't remember". When asked about her pain in foot, she says she has pain in right first toe since 3 days, but later says it started a week ago. She denies any preceding trauma or similar event in the past; also denies fever, chills, cough, diarrhea or any other symptoms.  As per ED note, patient was brought in by her son who reports pain started a month ago and was seeing her PMD for the same but her foot got swollen a day ago and was sent her by her PMD to r/o infection.     Tried to contact her son for further details however could not be reached.

## 2019-05-24 ENCOUNTER — TRANSCRIPTION ENCOUNTER (OUTPATIENT)
Age: 84
End: 2019-05-24

## 2019-05-24 VITALS
HEART RATE: 103 BPM | OXYGEN SATURATION: 98 % | TEMPERATURE: 98 F | RESPIRATION RATE: 18 BRPM | DIASTOLIC BLOOD PRESSURE: 62 MMHG | SYSTOLIC BLOOD PRESSURE: 113 MMHG

## 2019-05-24 LAB
ANION GAP SERPL CALC-SCNC: 8 MMOL/L — SIGNIFICANT CHANGE UP (ref 5–17)
BUN SERPL-MCNC: 8 MG/DL — SIGNIFICANT CHANGE UP (ref 7–18)
CALCIUM SERPL-MCNC: 8.3 MG/DL — LOW (ref 8.4–10.5)
CHLORIDE SERPL-SCNC: 105 MMOL/L — SIGNIFICANT CHANGE UP (ref 96–108)
CO2 SERPL-SCNC: 26 MMOL/L — SIGNIFICANT CHANGE UP (ref 22–31)
CREAT SERPL-MCNC: 0.53 MG/DL — SIGNIFICANT CHANGE UP (ref 0.5–1.3)
CULTURE RESULTS: NO GROWTH — SIGNIFICANT CHANGE UP
CULTURE RESULTS: NO GROWTH — SIGNIFICANT CHANGE UP
GLUCOSE SERPL-MCNC: 125 MG/DL — HIGH (ref 70–99)
POTASSIUM SERPL-MCNC: 3.3 MMOL/L — LOW (ref 3.5–5.3)
POTASSIUM SERPL-SCNC: 3.3 MMOL/L — LOW (ref 3.5–5.3)
SODIUM SERPL-SCNC: 139 MMOL/L — SIGNIFICANT CHANGE UP (ref 135–145)
SPECIMEN SOURCE: SIGNIFICANT CHANGE UP
SPECIMEN SOURCE: SIGNIFICANT CHANGE UP

## 2019-05-24 PROCEDURE — 80053 COMPREHEN METABOLIC PANEL: CPT

## 2019-05-24 PROCEDURE — 80048 BASIC METABOLIC PNL TOTAL CA: CPT

## 2019-05-24 PROCEDURE — 87040 BLOOD CULTURE FOR BACTERIA: CPT

## 2019-05-24 PROCEDURE — 93971 EXTREMITY STUDY: CPT

## 2019-05-24 PROCEDURE — 85610 PROTHROMBIN TIME: CPT

## 2019-05-24 PROCEDURE — 83735 ASSAY OF MAGNESIUM: CPT

## 2019-05-24 PROCEDURE — 99285 EMERGENCY DEPT VISIT HI MDM: CPT | Mod: 25

## 2019-05-24 PROCEDURE — 82009 KETONE BODYS QUAL: CPT

## 2019-05-24 PROCEDURE — 36415 COLL VENOUS BLD VENIPUNCTURE: CPT

## 2019-05-24 PROCEDURE — 96374 THER/PROPH/DIAG INJ IV PUSH: CPT

## 2019-05-24 PROCEDURE — 73620 X-RAY EXAM OF FOOT: CPT

## 2019-05-24 PROCEDURE — 99238 HOSP IP/OBS DSCHRG MGMT 30/<: CPT

## 2019-05-24 PROCEDURE — 71045 X-RAY EXAM CHEST 1 VIEW: CPT

## 2019-05-24 PROCEDURE — 85027 COMPLETE CBC AUTOMATED: CPT

## 2019-05-24 PROCEDURE — 87086 URINE CULTURE/COLONY COUNT: CPT

## 2019-05-24 PROCEDURE — 96375 TX/PRO/DX INJ NEW DRUG ADDON: CPT

## 2019-05-24 PROCEDURE — 81001 URINALYSIS AUTO W/SCOPE: CPT

## 2019-05-24 PROCEDURE — 93005 ELECTROCARDIOGRAM TRACING: CPT

## 2019-05-24 PROCEDURE — 85730 THROMBOPLASTIN TIME PARTIAL: CPT

## 2019-05-24 PROCEDURE — 97161 PT EVAL LOW COMPLEX 20 MIN: CPT

## 2019-05-24 PROCEDURE — 83605 ASSAY OF LACTIC ACID: CPT

## 2019-05-24 PROCEDURE — 83880 ASSAY OF NATRIURETIC PEPTIDE: CPT

## 2019-05-24 RX ORDER — LANOLIN ALCOHOL/MO/W.PET/CERES
3 CREAM (GRAM) TOPICAL AT BEDTIME
Refills: 0 | Status: DISCONTINUED | OUTPATIENT
Start: 2019-05-24 | End: 2019-05-24

## 2019-05-24 RX ORDER — POTASSIUM CHLORIDE 20 MEQ
40 PACKET (EA) ORAL ONCE
Refills: 0 | Status: COMPLETED | OUTPATIENT
Start: 2019-05-24 | End: 2019-05-24

## 2019-05-24 RX ORDER — LEVOTHYROXINE SODIUM 125 MCG
1 TABLET ORAL
Qty: 0 | Refills: 0 | DISCHARGE
Start: 2019-05-24

## 2019-05-24 RX ADMIN — Medication 3 MILLIGRAM(S): at 00:57

## 2019-05-24 RX ADMIN — Medication 40 MILLIEQUIVALENT(S): at 11:55

## 2019-05-24 RX ADMIN — Medication 250 MILLIGRAM(S): at 14:54

## 2019-05-24 RX ADMIN — Medication 250 MILLIGRAM(S): at 07:42

## 2019-05-24 RX ADMIN — ENOXAPARIN SODIUM 40 MILLIGRAM(S): 100 INJECTION SUBCUTANEOUS at 11:55

## 2019-05-24 RX ADMIN — Medication 250 MILLIGRAM(S): at 05:57

## 2019-05-24 RX ADMIN — Medication 1 TABLET(S): at 17:49

## 2019-05-24 RX ADMIN — Medication 1 TABLET(S): at 05:57

## 2019-05-24 RX ADMIN — Medication 40 MILLIGRAM(S): at 05:57

## 2019-05-24 RX ADMIN — Medication 25 MICROGRAM(S): at 05:57

## 2019-05-24 RX ADMIN — Medication 250 MILLIGRAM(S): at 15:30

## 2019-05-24 NOTE — DISCHARGE NOTE NURSING/CASE MANAGEMENT/SOCIAL WORK - NSDCDPATPORTLINK_GEN_ALL_CORE
You can access the [facility] Patient Portal, offered by [facility], by registering with the following website: [email or web address]

## 2019-05-24 NOTE — DISCHARGE NOTE PROVIDER - NSDCCPCAREPLAN_GEN_ALL_CORE_FT
PRINCIPAL DISCHARGE DIAGNOSIS  Diagnosis: Foot pain  Assessment and Plan of Treatment: you were admitted for right foot pain concerning for acute gout. Xray showed no broken bone or bone infection. Ultrasound of your leg showed no blood clot in your leg.   Please take prednisone as prescribed to decrease inflamation   Take tylenol or Motrin for pain as needed 6 hrs   Take motrin with food to prevent upset stomach   Please follow up with your doctor within one week for reevaluation      SECONDARY DISCHARGE DIAGNOSES  Diagnosis: Hyperlipidemia, unspecified hyperlipidemia type  Assessment and Plan of Treatment: Continue your medications and follow up wtih your doctor    Diagnosis: Hypertension, unspecified type  Assessment and Plan of Treatment: Continue with your blood pressure medications. maintain a healthy diet that consist of low sugar, low fat, low sodium diet. Please follow up with your PMD    Diagnosis: Hypothyroidism, unspecified type  Assessment and Plan of Treatment: Continue medications as prescribed by your doctor and follow up with PCP for further management PRINCIPAL DISCHARGE DIAGNOSIS  Diagnosis: Foot pain  Assessment and Plan of Treatment: you were admitted for right foot pain concerning for acute gout. Xray showed no broken bone or bone infection. Ultrasound of your leg showed no blood clot in your leg.   Please take prednisone as prescribed to decrease inflamation   Take tylenol or Motrin for pain as needed 6 hrs   Take motrin with food to prevent upset stomach   Please follow up with your doctor within one week for reevaluation  Follow up wt podiatryst and vascular doctr for further management   Office numbers are provided above      SECONDARY DISCHARGE DIAGNOSES  Diagnosis: Hyperlipidemia, unspecified hyperlipidemia type  Assessment and Plan of Treatment: Continue your medications and follow up wtih your doctor    Diagnosis: Hypertension, unspecified type  Assessment and Plan of Treatment: Continue with your blood pressure medications. maintain a healthy diet that consist of low sugar, low fat, low sodium diet. Please follow up with your PMD    Diagnosis: Hypothyroidism, unspecified type  Assessment and Plan of Treatment: Continue medications as prescribed by your doctor and follow up with PCP for further management PRINCIPAL DISCHARGE DIAGNOSIS  Diagnosis: Foot pain  Assessment and Plan of Treatment: you were admitted for right foot pain concerning for acute gout. Xray showed no broken bone or bone infection. Ultrasound of your leg showed no blood clot in your leg.   Please take prednisone as prescribed to decrease inflamation   Take Naproxen every 8 hours  for pain as needed   Take medication with food to prevent upset stomach   Please follow up with your doctor within one week for reevaluation  Follow up wt podiatryst and vascular doctr for further management   Office numbers are provided above      SECONDARY DISCHARGE DIAGNOSES  Diagnosis: Hyperlipidemia, unspecified hyperlipidemia type  Assessment and Plan of Treatment: Continue your medications and follow up wtih your doctor    Diagnosis: Hypertension, unspecified type  Assessment and Plan of Treatment: Continue with your blood pressure medications. maintain a healthy diet that consist of low sugar, low fat, low sodium diet. Please follow up with your PMD    Diagnosis: Hypothyroidism, unspecified type  Assessment and Plan of Treatment: Continue medications as prescribed by your doctor and follow up with PCP for further management

## 2019-05-24 NOTE — PROGRESS NOTE ADULT - ATTENDING COMMENTS
Patient was examined at the bedside and discussed with NP.   Pain is significantly improved.   Minimal erythema, bilateral lower extremities.  Right toe and metatarsal are less tender.  White Blood Cell - Urine: 6-10 /HPF (05.23.19 @ 15:05)    IMP:  Acute gouty arthritis         Hypothroidism         abnormal urinalysis, small number of leukocytes.  asymptomatic  Plan: Steroid taper, NSAID          asymptomatic bacteriuria          Discharge home, f/u Dr. Enma Cervantes.
Patient was examined at the bedside and discussed with JORGE Tran.   Son was present.   Patient has pain.  Erythema of right great toe is likely gout.  Vital Signs Last 24 Hrs  T(C): 36.8 (23 May 2019 14:11), Max: 36.8 (23 May 2019 14:11)  T(F): 98.3 (23 May 2019 14:11), Max: 98.3 (23 May 2019 14:11)  HR: 95 (23 May 2019 14:11) (81 - 95)  BP: 144/73 (23 May 2019 14:11) (143/62 - 155/73)  BP(mean): --  RR: 18 (23 May 2019 14:11) (16 - 18)  SpO2: 99% (23 May 2019 14:11) (99% - 99%)  05-22    138  |  103  |  7   ----------------------------<  129<H>  3.6   |  28  |  0.66    Ca    8.6      22 May 2019 19:42  Mg     2.0     05-22    TPro  6.9  /  Alb  3.7  /  TBili  0.7  /  DBili  x   /  AST  22  /  ALT  15  /  AlkPhos  92  05-22  Urinalysis (05.23.19 @ 15:05)    pH Urine: 6.0    Glucose Qualitative, Urine: Negative    Blood, Urine: Trace    Color: Yellow    Urine Appearance: Clear    Bilirubin: Negative    Ketone - Urine: Trace    Specific Gravity: 1.015    Protein, Urine: 15    Urobilinogen: Negative    Nitrite: Negative    Leukocyte Esterase Concentration: Small  Urine Microscopic-Add On (NC) (05.23.19 @ 15:05)    Red Blood Cell - Urine: 0-2 /HPF    White Blood Cell - Urine: 6-10 /HPF    Epithelial Cells: Few /HPF    IMP:  Acute gouty arthritis         Hypothroidism         abnormal urinalysis, asymptomatic  Plan: Steroids, NSAID, as above         Repeat u/a and culture--likely asymptomatic bacteriuria

## 2019-05-24 NOTE — PROGRESS NOTE ADULT - SUBJECTIVE AND OBJECTIVE BOX
MEDICAL ATTENDING NOTE  Patient is a 83y old  Female who presents with a chief complaint of right foot pain (24 May 2019 13:18)      HPI:  83F with PMH of dementia, HTN, HLD presented to ER with pain in right first toe.     Patient is a poor historian secondary to dementia who is unsure about why she is here, saying "I may have felt dizzy or something, I don't remember". When asked about her pain in foot, she says she has pain in right first toe since 3 days, but later says it started a week ago. She denies any preceding trauma or similar event in the past; also denies fever, chills, cough, diarrhea or any other symptoms.  As per ED note, patient was brought in by her son who reports pain started a month ago and was seeing her PMD for the same but her foot got swollen a day ago and was sent her by her PMD to r/o infection.     Tried to contact her son for further details however could not be reached. (23 May 2019 00:37)      INTERVAL HPI/OVERNIGHT EVENTS: no new complaints    MEDICATIONS  (STANDING):  enoxaparin Injectable 40 milliGRAM(s) SubCutaneous daily  levothyroxine 25 MICROGram(s) Oral daily  melatonin 3 milliGRAM(s) Oral at bedtime  naproxen 250 milliGRAM(s) Oral every 8 hours  predniSONE   Tablet   Oral   predniSONE   Tablet 40 milliGRAM(s) Oral daily  trimethoprim  160 mG/sulfamethoxazole 800 mG 1 Tablet(s) Oral every 12 hours    MEDICATIONS  (PRN):  acetaminophen   Tablet .. 650 milliGRAM(s) Oral every 6 hours PRN Mild Pain (1 - 3)      __________________________________________________  REVIEW OF SYSTEMS:    CONSTITUTIONAL: No fever,   EYES: no acute visual disturbances  NECK: No pain or stiffness  RESPIRATORY: No cough; No shortness of breath  CARDIOVASCULAR: No chest pain, no palpitations  GASTROINTESTINAL: No pain. No nausea or vomiting; No diarrhea   NEUROLOGICAL: No headache or numbness, no tremors  MUSCULOSKELETAL: No joint pain, no muscle pain  GENITOURINARY: no dysuria, no frequency, no hesitancy  PSYCHIATRY: no depression , no anxiety  ALL OTHER  ROS negative        Vital Signs Last 24 Hrs  T(C): 36.7 (24 May 2019 14:30), Max: 36.9 (24 May 2019 05:11)  T(F): 98.1 (24 May 2019 14:30), Max: 98.5 (24 May 2019 05:11)  HR: 103 (24 May 2019 14:30) (101 - 106)  BP: 113/62 (24 May 2019 14:30) (113/62 - 158/83)  BP(mean): --  RR: 18 (24 May 2019 14:30) (18 - 18)  SpO2: 98% (24 May 2019 14:30) (96% - 98%)    ________________________________________________  PHYSICAL EXAM:  GENERAL: NAD  HEENT: Normocephalic;  conjunctivae and sclerae clear; moist mucous membranes;   NECK : supple  CHEST/LUNG: Clear to auscultation bilaterally with good air entry   HEART: S1 S2  regular; no murmurs, gallops or rubs  ABDOMEN: Soft, Nontender, Nondistended; Bowel sounds present  EXTREMITIES: no cyanosis; no edema; no calf tenderness  SKIN: warm and dry; no rash  NERVOUS SYSTEM:  Awake and alert; Oriented  to place, person and time ; no new deficits    _________________________________________________  LABS:                        13.3   6.55  )-----------( 202      ( 22 May 2019 19:42 )             39.9         139  |  105  |  8   ----------------------------<  125<H>  3.3<L>   |  26  |  0.53    Ca    8.3<L>      24 May 2019 07:14  Mg     2.0         TPro  6.9  /  Alb  3.7  /  TBili  0.7  /  DBili  x   /  AST  22  /  ALT  15  /  AlkPhos  92      PT/INR - ( 22 May 2019 19:42 )   PT: 12.1 sec;   INR: 1.09 ratio         PTT - ( 22 May 2019 19:42 )  PTT:29.8 sec  Urinalysis Basic - ( 23 May 2019 15:05 )    Color: Yellow / Appearance: Clear / S.015 / pH: x  Gluc: x / Ketone: Trace  / Bili: Negative / Urobili: Negative   Blood: x / Protein: 15 / Nitrite: Negative   Leuk Esterase: Small / RBC: 0-2 /HPF / WBC 6-10 /HPF   Sq Epi: x / Non Sq Epi: Few /HPF / Bacteria: x

## 2019-05-24 NOTE — DISCHARGE NOTE PROVIDER - CARE PROVIDERS DIRECT ADDRESSES
[email or web address]

## 2019-05-24 NOTE — DISCHARGE NOTE PROVIDER - HOSPITAL COURSE
83F with PMH of dementia, HTN, HLD presented to ER with pain, swelling of right ankle and right first toe for one month worsening for the past 3 days concerning for acute gout vs cellulitis vs occult fx     In ER, her vitals were stable, labs wnl. Doppler negative for DVT.      Right foot x-rays with no evidence for an acute fracture or dislocation.    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    Mild osteopenia. No radiographic evidence for osteomyelitis.     Small calcaneal spur.    Patietn was given a dose each of levaquin and vancomycin.     Started on steroids, pain controlled with Ibuprofen and tylenol     Blood culture and urine culture negative UTD     patietn was evalauted by PT with recommendation for home PT and home health aid.

## 2019-05-27 LAB
CULTURE RESULTS: SIGNIFICANT CHANGE UP
CULTURE RESULTS: SIGNIFICANT CHANGE UP
SPECIMEN SOURCE: SIGNIFICANT CHANGE UP
SPECIMEN SOURCE: SIGNIFICANT CHANGE UP

## 2019-05-28 ENCOUNTER — OTHER (OUTPATIENT)
Age: 84
End: 2019-05-28

## 2019-06-04 DIAGNOSIS — M79.604 PAIN IN RIGHT LEG: ICD-10-CM

## 2019-06-04 DIAGNOSIS — R41.3 OTHER AMNESIA: ICD-10-CM

## 2019-06-11 PROBLEM — E03.9 HYPOTHYROIDISM, UNSPECIFIED: Chronic | Status: ACTIVE | Noted: 2019-05-22

## 2019-06-11 PROBLEM — E78.5 HYPERLIPIDEMIA, UNSPECIFIED: Chronic | Status: ACTIVE | Noted: 2019-05-22

## 2019-06-11 PROBLEM — C44.90 UNSPECIFIED MALIGNANT NEOPLASM OF SKIN, UNSPECIFIED: Chronic | Status: ACTIVE | Noted: 2019-05-22

## 2019-06-11 PROBLEM — I73.9 PERIPHERAL VASCULAR DISEASE, UNSPECIFIED: Chronic | Status: ACTIVE | Noted: 2019-05-22

## 2019-06-11 PROBLEM — F03.90 UNSPECIFIED DEMENTIA WITHOUT BEHAVIORAL DISTURBANCE: Chronic | Status: ACTIVE | Noted: 2019-05-22

## 2019-06-11 PROBLEM — I10 ESSENTIAL (PRIMARY) HYPERTENSION: Chronic | Status: ACTIVE | Noted: 2019-05-22

## 2019-06-17 ENCOUNTER — OTHER (OUTPATIENT)
Age: 84
End: 2019-06-17

## 2019-06-18 ENCOUNTER — APPOINTMENT (OUTPATIENT)
Dept: INTERNAL MEDICINE | Facility: CLINIC | Age: 84
End: 2019-06-18
Payer: MEDICARE

## 2019-06-18 VITALS
DIASTOLIC BLOOD PRESSURE: 81 MMHG | HEIGHT: 63 IN | WEIGHT: 142 LBS | SYSTOLIC BLOOD PRESSURE: 129 MMHG | OXYGEN SATURATION: 98 % | BODY MASS INDEX: 25.16 KG/M2 | RESPIRATION RATE: 16 BRPM | TEMPERATURE: 98.1 F | HEART RATE: 74 BPM

## 2019-06-18 DIAGNOSIS — R42 DIZZINESS AND GIDDINESS: ICD-10-CM

## 2019-06-18 DIAGNOSIS — M10.9 GOUT, UNSPECIFIED: ICD-10-CM

## 2019-06-18 DIAGNOSIS — E03.9 HYPOTHYROIDISM, UNSPECIFIED: ICD-10-CM

## 2019-06-18 DIAGNOSIS — L03.115 CELLULITIS OF RIGHT LOWER LIMB: ICD-10-CM

## 2019-06-18 DIAGNOSIS — I10 ESSENTIAL (PRIMARY) HYPERTENSION: ICD-10-CM

## 2019-06-18 PROCEDURE — 99213 OFFICE O/P EST LOW 20 MIN: CPT

## 2019-06-18 NOTE — HISTORY OF PRESENT ILLNESS
[Family Member] : family member [FreeTextEntry1] : f/u [de-identified] : 82 yo with slowly resolving R.foot gouty/OA's !st toe,the wound,the cellulitis changes resolved,the erythema 1st MTS joint remains,pt reports less pain,occ.Tylenol helps.\par pt was Rxed by podiatry,c&s p,she is on Ax's,the name?\par Currently c/o dizziness in the morning,that resolves within an hour.no falls.denies amaurosis,palpitations,c.p.,sob.\par \par Hospital Course \par 83F with PMH of  HTN, HLD presented to ER with pain, swelling of right ankle and right first toe for one month worsening for the past 3 days concerning for acute gout vs cellulitis vs occult fx\par In ER, her vitals were stable, labs wnl. 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 No radiographic evidence for osteomyelitis.\par Small calcaneal spur.\par Patient was given a dose each of Levaquin and vancomycin.\par Started on steroids, pain controlled with Ibuprofen and tylenol\par Blood culture and urine culture negative UTD patietn was evalauted by PT with recommendation for home PT and home health aid.\par PMH mild HTN,PAD,st.p.stent placement to L.popliteal artery,balloon angioplasty to R.popliteal artery,on ASA..\par h/o HLP,heart murmur,no CAD as per card.from [facility] [place].had neg.carotid duplex us.

## 2019-06-18 NOTE — PHYSICAL EXAM
[No Acute Distress] : no acute distress [Ill-Appearing] : ill-appearing [Normal Sclera/Conjunctiva] : normal sclera/conjunctiva [PERRL] : pupils equal round and reactive to light [EOMI] : extraocular movements intact [No JVD] : no jugular venous distention [No Lymphadenopathy] : no lymphadenopathy [Supple] : supple [Thyroid Normal, No Nodules] : the thyroid was normal and there were no nodules present [No Respiratory Distress] : no respiratory distress  [Clear to Auscultation] : lungs were clear to auscultation bilaterally [Normal Rate] : normal rate  [Regular Rhythm] : with a regular rhythm [Normal S1, S2] : normal S1 and S2 [No Edema] : there was no peripheral edema [Normal Appearance] : normal in appearance [No Nipple Discharge] : no nipple discharge [No Axillary Lymphadenopathy] : no axillary lymphadenopathy [Soft] : abdomen soft [No Masses] : no abdominal mass palpated [No HSM] : no HSM [Declined Rectal Exam] : declined rectal exam [Normal Sphincter Tone] : normal sphincter tone [No Mass] : no mass [Normal Supraclavicular Nodes] : no supraclavicular lymphadenopathy [Normal Axillary Nodes] : no axillary lymphadenopathy [Normal Posterior Cervical Nodes] : no posterior cervical lymphadenopathy [Normal Anterior Cervical Nodes] : no anterior cervical lymphadenopathy [Kyphosis] : kyphosis [No Rash] : no rash [Normal Gait] : normal gait [No Focal Deficits] : no focal deficits [Normal Affect] : the affect was normal [Normal Insight/Judgement] : insight and judgment were intact [de-identified] : overwt.poor hygiene [Alert and Oriented x3] : oriented to person, place, and time [de-identified] : MICHAEL 1/6 over precordium [de-identified] : R.1st MTS joint erythema,no more ankle,lower 1/3 tibia cellulitis. [de-identified] : carotid bruit.pedal pulses absent

## 2019-06-18 NOTE — HISTORY OF PRESENT ILLNESS
[de-identified] : 84 yo with recent onset of pain,swelling,redness,RLE.\par PMH mild HTN,PAD,st.p.stent placement to L.popliteal artery,balloon angioplasty to R.popliteal artery,was on dual APT until recently,now on ASA.\par h/o HLP,heart murmur,no CAD as per card.from [facility] [place].had neg.carotid duplex us.denies CV Sx's

## 2019-06-18 NOTE — COUNSELING
[Healthy eating counseling provided] : healthy eating [Fall prevention counseling provided] : fall prevention  [Activity counseling provided] : activity [Low Salt Diet] : Low salt diet [Walking] : Walking [Adequate lighting] : Adequate lighting [No throw rugs] : No throw rugs [Use proper foot wear] : Use proper foot wear [Use recommended devices] : Use recommended devices [None] : None [Good understanding] : Patient has a good understanding of lifestyle changes and the steps needed to achieve self management goals

## 2019-06-18 NOTE — PHYSICAL EXAM
[No Acute Distress] : no acute distress [Normal Sclera/Conjunctiva] : normal sclera/conjunctiva [PERRL] : pupils equal round and reactive to light [EOMI] : extraocular movements intact [No JVD] : no jugular venous distention [Supple] : supple [No Lymphadenopathy] : no lymphadenopathy [Thyroid Normal, No Nodules] : the thyroid was normal and there were no nodules present [No Respiratory Distress] : no respiratory distress  [Clear to Auscultation] : lungs were clear to auscultation bilaterally [Normal Rate] : normal rate  [Regular Rhythm] : with a regular rhythm [Normal S1, S2] : normal S1 and S2 [No Edema] : there was no peripheral edema [Normal Appearance] : normal in appearance [No Nipple Discharge] : no nipple discharge [No Axillary Lymphadenopathy] : no axillary lymphadenopathy [Soft] : abdomen soft [No Masses] : no abdominal mass palpated [No HSM] : no HSM [Declined Rectal Exam] : declined rectal exam [Normal Sphincter Tone] : normal sphincter tone [No Mass] : no mass [Normal Posterior Cervical Nodes] : no posterior cervical lymphadenopathy [Normal Anterior Cervical Nodes] : no anterior cervical lymphadenopathy [Kyphosis] : kyphosis [No Rash] : no rash [Normal Gait] : normal gait [No Focal Deficits] : no focal deficits [Normal Affect] : the affect was normal [Normal Insight/Judgement] : insight and judgment were intact [Ill-Appearing] : ill-appearing [Normal Supraclavicular Nodes] : no supraclavicular lymphadenopathy [Normal Axillary Nodes] : no axillary lymphadenopathy [de-identified] : MICHAEL 1/6 over precordium [de-identified] : overwt.poor hygiene [de-identified] : carotid bruit.pedal pulses absent [de-identified] : R.foot,1 toe,ankle,lower 1/3 of tibia erythema,swelling,tenderness.,

## 2019-06-18 NOTE — ASSESSMENT
[FreeTextEntry1] : 82 yo with  HTN,HLP,subclinical hypothyroidism,h/o PAD,st.p.b/l revascularization procedures(stent on the L.,balloon on the R.),presented today with recent onset of progressive pain,swelling,redness,RLE(foot,ankle and lower 1/3 tibia).referred to ER for eval/management of cellulitis,r/o osteo.

## 2019-06-18 NOTE — ASSESSMENT
[FreeTextEntry1] : 84 yo with  HTN,HLP,subclinical hypothyroidism,h/o PAD,st.p.b/l revascularization procedures(stent on the L.,balloon on the R.to popleteal a.),was recently evaluated in the [facility] and subsequently by podiatry for R.1st toe acute arthritis/poss.Gout,ass.with cellulitis infected wound,Rxed with Ax's,improved.currently takes occ.Tylenol prn.on Ax's as per podiatry for infected 1 st toe nail.CT R.foot shows no osteo.\par referred to vasc.by podiatry +to f/u on PAD.\par Dizziness in am,the pt's son reports low BP,will make Atenolol 25 mg bid instead of 50 mg bid.\par labs taken.

## 2019-06-19 DIAGNOSIS — F41.9 ANXIETY DISORDER, UNSPECIFIED: ICD-10-CM

## 2019-06-19 DIAGNOSIS — E53.8 DEFICIENCY OF OTHER SPECIFIED B GROUP VITAMINS: ICD-10-CM

## 2019-06-19 LAB
ANION GAP SERPL CALC-SCNC: 11 MMOL/L
BASOPHILS # BLD AUTO: 0.09 K/UL
BASOPHILS NFR BLD AUTO: 1.5 %
BUN SERPL-MCNC: 9 MG/DL
CALCIUM SERPL-MCNC: 9.1 MG/DL
CHLORIDE SERPL-SCNC: 100 MMOL/L
CO2 SERPL-SCNC: 27 MMOL/L
CREAT SERPL-MCNC: 0.64 MG/DL
EOSINOPHIL # BLD AUTO: 0.11 K/UL
EOSINOPHIL NFR BLD AUTO: 1.8 %
ERYTHROCYTE [SEDIMENTATION RATE] IN BLOOD BY WESTERGREN METHOD: 2 MM/HR
GLUCOSE SERPL-MCNC: 102 MG/DL
HCT VFR BLD CALC: 40 %
HGB BLD-MCNC: 13.1 G/DL
IMM GRANULOCYTES NFR BLD AUTO: 0.3 %
LYMPHOCYTES # BLD AUTO: 1.31 K/UL
LYMPHOCYTES NFR BLD AUTO: 21.5 %
MAN DIFF?: NORMAL
MCHC RBC-ENTMCNC: 31.9 PG
MCHC RBC-ENTMCNC: 32.8 GM/DL
MCV RBC AUTO: 97.3 FL
MONOCYTES # BLD AUTO: 0.51 K/UL
MONOCYTES NFR BLD AUTO: 8.4 %
NEUTROPHILS # BLD AUTO: 4.05 K/UL
NEUTROPHILS NFR BLD AUTO: 66.5 %
PLATELET # BLD AUTO: 201 K/UL
POTASSIUM SERPL-SCNC: 4.1 MMOL/L
RBC # BLD: 4.11 M/UL
RBC # FLD: 13.8 %
SODIUM SERPL-SCNC: 138 MMOL/L
TSH SERPL-ACNC: 3.77 UIU/ML
URATE SERPL-MCNC: 4.3 MG/DL
VIT B12 SERPL-MCNC: 294 PG/ML
WBC # FLD AUTO: 6.09 K/UL

## 2019-06-19 RX ORDER — CHLORHEXIDINE GLUCONATE 4 %
1000 LIQUID (ML) TOPICAL DAILY
Qty: 90 | Refills: 3 | Status: ACTIVE | COMMUNITY
Start: 2019-06-19 | End: 1900-01-01

## 2019-07-09 PROBLEM — F41.9 ANXIETY: Status: ACTIVE | Noted: 2019-07-09

## 2019-07-10 ENCOUNTER — MEDICATION RENEWAL (OUTPATIENT)
Age: 84
End: 2019-07-10

## 2019-07-10 RX ORDER — DIAZEPAM 2 MG/1
2 TABLET ORAL
Qty: 60 | Refills: 0 | Status: DISCONTINUED | OUTPATIENT
Start: 2019-07-09 | End: 2019-07-10

## 2019-07-10 RX ORDER — DIAZEPAM 2 MG/1
2 TABLET ORAL
Qty: 60 | Refills: 0 | Status: DISCONTINUED | OUTPATIENT
Start: 2019-07-10 | End: 2019-07-10

## 2019-07-10 RX ORDER — DIAZEPAM 2 MG/1
2 TABLET ORAL
Qty: 60 | Refills: 0 | Status: ACTIVE | OUTPATIENT
Start: 2019-07-10

## 2019-09-09 ENCOUNTER — APPOINTMENT (OUTPATIENT)
Dept: VASCULAR SURGERY | Facility: CLINIC | Age: 84
End: 2019-09-09

## 2019-10-25 ENCOUNTER — TRANSCRIPTION ENCOUNTER (OUTPATIENT)
Age: 84
End: 2019-10-25

## 2023-11-09 NOTE — PROGRESS NOTE ADULT - PROBLEM/PLAN-4
Quality 431: Preventive Care And Screening: Unhealthy Alcohol Use - Screening: Patient not identified as an unhealthy alcohol user when screened for unhealthy alcohol use using a systematic screening method
Quality 226: Preventive Care And Screening: Tobacco Use: Screening And Cessation Intervention: Patient screened for tobacco use, is a smoker AND received Cessation Counseling within measurement period or in the six months prior to the measurement period
Detail Level: Detailed
Quality 130: Documentation Of Current Medications In The Medical Record: Current Medications Documented
DISPLAY PLAN FREE TEXT
DISPLAY PLAN FREE TEXT

## 2023-11-18 NOTE — H&P ADULT - NSICDXPASTMEDICALHX_GEN_ALL_CORE_FT
English PAST MEDICAL HISTORY:  Dementia without behavioral disturbance, unspecified dementia type     Hyperlipidemia, unspecified hyperlipidemia type     Hypertension, unspecified type     Hypothyroidism, unspecified type     Peripheral vascular disease     Skin cancer

## 2025-02-17 NOTE — DISCHARGE NOTE NURSING/CASE MANAGEMENT/SOCIAL WORK - NURSING SECTION COMPLETE
Quality 137: Melanoma: Continuity Of Care - Recall System: Patient information entered into a recall system that includes: target date for the next exam specified AND a process to follow up with patients regarding missed or unscheduled appointments Quality 226: Preventive Care And Screening: Tobacco Use: Screening And Cessation Intervention: Patient screened for tobacco use and is an ex/non-smoker Detail Level: Detailed Patient/Caregiver provided printed discharge information.